# Patient Record
Sex: MALE | Race: WHITE
[De-identification: names, ages, dates, MRNs, and addresses within clinical notes are randomized per-mention and may not be internally consistent; named-entity substitution may affect disease eponyms.]

---

## 2021-12-05 ENCOUNTER — HOSPITAL ENCOUNTER (EMERGENCY)
Dept: HOSPITAL 11 - JP.ED | Age: 31
Discharge: HOME | End: 2021-12-05
Payer: COMMERCIAL

## 2021-12-05 DIAGNOSIS — U07.1: Primary | ICD-10-CM

## 2021-12-05 PROCEDURE — U0002 COVID-19 LAB TEST NON-CDC: HCPCS

## 2021-12-05 NOTE — EDM.PDOC
ED HPI GENERAL MEDICAL PROBLEM





- General


Chief Complaint: Respiratory Problem


Stated Complaint: COUGH, MIGRAINE


Time Seen by Provider: 12/05/21 13:20


Source of Information: Reports: Patient, RN


History Limitations: Reports: No Limitations





- History of Present Illness


INITIAL COMMENTS - FREE TEXT/NARRATIVE: 





30 yo male visiting a female friend. He is from [facility] and was going to be 

leaving to return to work tomorrow, but if he has Covid he does not have to 

return to work. No fever. Has mild MOONEY. Cough non-productive. His sx's are 

mainly a dry cough and HA. No known exposures. No Covid vaccines. 


Onset: Gradual


Onset Date: 11/29/21


Duration: Week(s): (1), Constant


Location: Reports: Head, Chest


Quality: Reports: Ache


Severity: Moderate


Improves with: Reports: None


Worsens with: Reports: Other (coughing)


Context: Reports: Other (See HPI)


Associated Symptoms: Reports: Cough, Headaches, Shortness of Breath (MOONEY).  

Denies: Chest Pain, Fever/Chills, Nausea/Vomiting


Treatments PTA: Reports: Other (see below) (none)


  ** Headache


Pain Score (Numeric/FACES): 8





- Related Data


                                    Allergies











Allergy/AdvReac Type Severity Reaction Status Date / Time


 


No Known Allergies Allergy   Verified 12/05/21 13:22











Home Meds: 


                                    Home Meds





NK [No Known Home Meds]  12/05/21 [History]











ED ROS GENERAL





- Review of Systems


Review Of Systems: See Below


Constitutional: Reports: No Symptoms


HEENT: Reports: No Symptoms


Respiratory: Reports: Cough.  Denies: Shortness of Breath, Sputum


Cardiovascular: Reports: No Symptoms


Endocrine: Reports: No Symptoms


GI/Abdominal: Reports: No Symptoms


: Reports: No Symptoms


Musculoskeletal: Reports: No Symptoms


Skin: Reports: No Symptoms


Neurological: Reports: No Symptoms


Psychiatric: Reports: No Symptoms





ED EXAM, GENERAL





- Physical Exam


Exam: See Below


Exam Limited By: No Limitations


General Appearance: Alert, WD/WN, No Apparent Distress


Eye Exam: Bilateral Eye: Normal Inspection


Ears: Normal External Exam, Normal Canal, Hearing Grossly Normal, Normal TMs


Ear Exam: Bilateral Ear: Auricle Normal, Canal Normal, TM normal


Nose: Normal Inspection, No Blood


Throat/Mouth: Normal Inspection, Normal Lips, Normal Oropharynx, Normal Voice, 

No Airway Compromise


Head: Atraumatic, Normocephalic


Neck: Normal Inspection


Respiratory/Chest: No Respiratory Distress, Lungs Clear, Normal Breath Sounds, 

No Accessory Muscle Use


Cardiovascular: Regular Rate, Rhythm, No Edema


GI/Abdominal: Soft, Non-Tender


Back Exam: Normal Inspection.  No: CVA Tenderness (R), CVA Tenderness (L)


Extremities: Normal Inspection, Normal Range of Motion, Non-Tender, No Pedal 

Edema


Neurological: Alert, Oriented, CN II-XII Intact, Normal Cognition, No 

Motor/Sensory Deficits


Psychiatric: Normal Affect, Normal Mood


Skin Exam: Warm, Dry, Intact, Normal Color, No Rash





Course





- Vital Signs


Last Recorded V/S: 


                                Last Vital Signs











Temp  36.9 C   12/05/21 13:20


 


Pulse  99   12/05/21 13:20


 


Resp  18   12/05/21 13:20


 


BP  177/108 H  12/05/21 13:20


 


Pulse Ox  94 L  12/05/21 13:20














- Orders/Labs/Meds


Labs: 


                                Laboratory Tests











  12/05/21 Range/Units





  13:22 


 


SARS CoV-2 RNA Rapid GUSTAVO  Positive H  











Meds: 


Medications














Discontinued Medications














Generic Name Dose Route Start Last Admin





  Trade Name Krystal  PRN Reason Stop Dose Admin


 


Acetaminophen  1,000 mg  12/05/21 13:33 





  Acetaminophen 500 Mg Tab  PO  12/05/21 13:34 





  ONETIME ONE  














Departure





- Departure


Time of Disposition: 13:43


Disposition: Home, Self-Care 01


Condition: Fair


Clinical Impression: 


 COVID-19








- Discharge Information


*PRESCRIPTION DRUG MONITORING PROGRAM REVIEWED*: Not Applicable


*COPY OF PRESCRIPTION DRUG MONITORING REPORT IN PATIENT JOEL: Not Applicable


Instructions:  Symptoms of COVID-19 - CDC (02/22/2021), COVID-19 Frequently 

Asked Questions


Referrals: 


PCP,None [Primary Care Provider] - 


Forms:  ED Department Discharge


Additional Instructions: 


Robitussin DM as needed for cough. Acetaminophen for headache. Isolate for the 

next 8 days to prevent spread. Stay in touch with your provider about your 

status. 





Sepsis Event Note (ED)





- Evaluation


Sepsis Screening Result: No Definite Risk





- Focused Exam


Vital Signs: 


                                   Vital Signs











  Temp Pulse Resp BP Pulse Ox


 


 12/05/21 13:20  36.9 C  99  18  177/108 H  94 L